# Patient Record
Sex: FEMALE | Race: BLACK OR AFRICAN AMERICAN
[De-identification: names, ages, dates, MRNs, and addresses within clinical notes are randomized per-mention and may not be internally consistent; named-entity substitution may affect disease eponyms.]

---
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
## 2023-08-01 ENCOUNTER — OFFICE VISIT (OUTPATIENT)
Dept: OBSTETRICS AND GYNECOLOGY | Facility: CLINIC | Age: 18
End: 2023-08-01
Payer: COMMERCIAL

## 2023-08-01 VITALS
DIASTOLIC BLOOD PRESSURE: 79 MMHG | WEIGHT: 235 LBS | BODY MASS INDEX: 36.88 KG/M2 | HEIGHT: 67 IN | SYSTOLIC BLOOD PRESSURE: 115 MMHG

## 2023-08-01 DIAGNOSIS — Z01.419 VISIT FOR GYNECOLOGIC EXAMINATION: Primary | ICD-10-CM

## 2023-08-01 DIAGNOSIS — N93.8 DYSFUNCTIONAL UTERINE BLEEDING: ICD-10-CM

## 2023-08-01 DIAGNOSIS — Z11.3 SCREENING FOR STDS (SEXUALLY TRANSMITTED DISEASES): ICD-10-CM

## 2023-08-02 LAB
FSH SERPL-ACNC: 6 MIU/ML
TSH SERPL DL<=0.005 MIU/L-ACNC: 1.15 UIU/ML (ref 0.45–4.5)

## 2023-08-03 ENCOUNTER — TELEPHONE (OUTPATIENT)
Dept: OBSTETRICS AND GYNECOLOGY | Facility: CLINIC | Age: 18
End: 2023-08-03
Payer: COMMERCIAL

## 2024-08-06 ENCOUNTER — OFFICE VISIT (OUTPATIENT)
Dept: OBSTETRICS AND GYNECOLOGY | Facility: CLINIC | Age: 19
End: 2024-08-06
Payer: COMMERCIAL

## 2024-08-06 VITALS
WEIGHT: 234 LBS | SYSTOLIC BLOOD PRESSURE: 111 MMHG | HEIGHT: 66 IN | DIASTOLIC BLOOD PRESSURE: 68 MMHG | BODY MASS INDEX: 37.61 KG/M2

## 2024-08-06 DIAGNOSIS — Z01.419 VISIT FOR GYNECOLOGIC EXAMINATION: Primary | ICD-10-CM

## 2024-08-06 DIAGNOSIS — N89.8 VAGINAL DISCHARGE: ICD-10-CM

## 2024-08-06 DIAGNOSIS — Z32.00 VISIT FOR CONFIRMATION OF PREGNANCY TEST RESULT WITH PHYSICAL EXAM: ICD-10-CM

## 2024-08-06 LAB
B-HCG UR QL: POSITIVE
EXPIRATION DATE: ABNORMAL
INTERNAL NEGATIVE CONTROL: NEGATIVE
INTERNAL POSITIVE CONTROL: POSITIVE
Lab: ABNORMAL

## 2024-08-06 RX ORDER — ALBUTEROL SULFATE 90 UG/1
2 AEROSOL, METERED RESPIRATORY (INHALATION)
COMMUNITY
Start: 2024-07-15

## 2024-08-06 RX ORDER — PNV NO.95/FERROUS FUM/FOLIC AC 28MG-0.8MG
1 TABLET ORAL DAILY
Qty: 30 TABLET | Refills: 11 | Status: SHIPPED | OUTPATIENT
Start: 2024-08-06

## 2024-08-06 NOTE — PROGRESS NOTES
[facility]  [address], [address]  [place], [place] [address]  Phone: [phone] / Fax:  [phone]      2024    [address] [facility] [address]    Provider, No Known    Chief Complaint   Patient presents with    Gynecologic Exam     Annual Exam, 1st pap. Patient state she took 2 home UCGs yesterday that was positive. LMP 6-28-24 making her 5.4 weeks in gestation. UCG in office is positive. She is c/o clear discharge.       Latonya Jordan is here for annual gynecologic exam.  HPI - Patient with no previous pap history.  She has regular cycles and not on contraception.  She missed her last period at end of  and had 2 positive home pregnancy tests.  She reports having a clear vaginal discharge but denies STD exposure.    Past Medical History:   Diagnosis Date    Asthma     COVID-19 2022    Not vaxxed       Past Surgical History:   Procedure Laterality Date    TONSILLO-UVULOPALATOPHARYNGOPLASTY Left 2022    abscess       Allergies   Allergen Reactions    Latex Itching       Social History     Socioeconomic History    Marital status: Single   Tobacco Use    Smoking status: Never   Vaping Use    Vaping status: Never Used   Substance and Sexual Activity    Alcohol use: Not Currently    Drug use: Not Currently     Types: Marijuana    Sexual activity: Yes     Birth control/protection: None       Family History   Problem Relation Age of Onset    No Known Problems Father     No Known Problems Mother     No Known Problems Brother     No Known Problems Brother     No Known Problems Brother     No Known Problems Brother     No Known Problems Brother     No Known Problems Brother     No Known Problems Brother     No Known Problems Brother     No Known Problems Sister     No Known Problems Sister     Breast cancer Neg Hx     Colon cancer Neg Hx        Patient's last menstrual period was 2024 (exact date).    OB History          1    Para   0    Term   0      "  0    AB   0    Living   0         SAB   0    IAB   0    Ectopic   0    Molar   0    Multiple   0    Live Births   0                Vitals:    08/06/24 1109   BP: 111/68   Weight: 106 kg (234 lb)   Height: 167.6 cm (66\")       Physical Exam  Constitutional:       Appearance: Normal appearance. She is well-developed.   Genitourinary:      Bladder and urethral meatus normal.      Right Labia: No tenderness or lesions.     Left Labia: No tenderness or lesions.     No vaginal discharge or tenderness.        Right Adnexa: not tender and not full.     Left Adnexa: not tender and not full.     No cervical motion tenderness or lesion.      Uterus is not enlarged or tender.      No urethral tenderness or hypermobility present.   Breasts:     Right: No mass or nipple discharge.      Left: No mass or nipple discharge.   HENT:      Right Ear: External ear normal.      Left Ear: External ear normal.      Nose: Nose normal.   Eyes:      Conjunctiva/sclera: Conjunctivae normal.   Neck:      Thyroid: No thyromegaly.   Cardiovascular:      Rate and Rhythm: Normal rate and regular rhythm.      Heart sounds: Normal heart sounds.   Pulmonary:      Effort: Pulmonary effort is normal.      Breath sounds: No stridor. No wheezing.   Abdominal:      Palpations: Abdomen is soft.      Tenderness: There is no abdominal tenderness. There is no guarding or rebound.   Musculoskeletal:         General: Normal range of motion.      Cervical back: Normal range of motion and neck supple.   Neurological:      Mental Status: She is alert.      Coordination: Coordination normal.   Skin:     General: Skin is warm and dry.   Psychiatric:         Mood and Affect: Mood normal.         Behavior: Behavior normal.         Thought Content: Thought content normal.         Judgment: Judgment normal.   Vitals reviewed. Exam conducted with a chaperone present.         Diagnoses and all orders for this visit:    1. Visit for gynecologic examination (Primary)  -     " IGP, Rfx Aptima HPV ASCU  -     Discussed importance of regular screening and breast awareness.    2. Visit for confirmation of pregnancy test result with physical exam  -     Prenatal Vit-Fe Fumarate-FA (Prenatal Vitamin) 27-0.8 MG tablet; Take 1 tablet by mouth Daily.  Dispense: 30 tablet; Refill: 11  -     POC Pregnancy, Urine  -     Discussed management of early pregnancy.  Important to take folate and prenatal vitamins prescribed.  Patient should eat healthy and return for follow up/ultrasound in 2 weeks.  Patient is considering termination of pregnancy; discussed not delaying process if patient plans to take this route.  She will need follow up with me afterward.    3. Vaginal discharge  -     NuSwab VG+ - Swab, Vagina  -     Await swab.        Danial Maldonado MD

## 2024-08-09 ENCOUNTER — TELEPHONE (OUTPATIENT)
Dept: OBSTETRICS AND GYNECOLOGY | Facility: CLINIC | Age: 19
End: 2024-08-09
Payer: COMMERCIAL

## 2024-08-09 LAB
A VAGINAE DNA VAG QL NAA+PROBE: ABNORMAL SCORE
BVAB2 DNA VAG QL NAA+PROBE: ABNORMAL SCORE
C ALBICANS DNA VAG QL NAA+PROBE: POSITIVE
C GLABRATA DNA VAG QL NAA+PROBE: NEGATIVE
C TRACH DNA SPEC QL NAA+PROBE: NEGATIVE
CONV .: NORMAL
CYTOLOGIST CVX/VAG CYTO: NORMAL
CYTOLOGY CVX/VAG DOC CYTO: NORMAL
CYTOLOGY CVX/VAG DOC THIN PREP: NORMAL
DX ICD CODE: NORMAL
Lab: NORMAL
MEGA1 DNA VAG QL NAA+PROBE: ABNORMAL SCORE
N GONORRHOEA DNA VAG QL NAA+PROBE: NEGATIVE
OTHER STN SPEC: NORMAL
STAT OF ADQ CVX/VAG CYTO-IMP: NORMAL
T VAGINALIS DNA VAG QL NAA+PROBE: NEGATIVE

## 2024-08-09 RX ORDER — FLUCONAZOLE 150 MG/1
150 TABLET ORAL ONCE
Qty: 1 TABLET | Refills: 0 | Status: SHIPPED | OUTPATIENT
Start: 2024-08-09 | End: 2024-08-09

## 2024-08-09 RX ORDER — METRONIDAZOLE 500 MG/1
500 TABLET ORAL 2 TIMES DAILY
Qty: 14 TABLET | Refills: 0 | Status: SHIPPED | OUTPATIENT
Start: 2024-08-09 | End: 2024-08-09 | Stop reason: SDUPTHER

## 2024-08-09 RX ORDER — FLUCONAZOLE 150 MG/1
150 TABLET ORAL ONCE
Qty: 1 TABLET | Refills: 0 | Status: SHIPPED | OUTPATIENT
Start: 2024-08-09 | End: 2024-08-09 | Stop reason: SDUPTHER

## 2024-08-09 RX ORDER — METRONIDAZOLE 500 MG/1
500 TABLET ORAL 2 TIMES DAILY
Qty: 14 TABLET | Refills: 0 | Status: SHIPPED | OUTPATIENT
Start: 2024-08-09 | End: 2024-08-16

## 2024-08-09 NOTE — TELEPHONE ENCOUNTER
Left message for Latonya that Dr Maldonado said you have a yeast and bacterial infection. He sent in fluconazole(diflucan) for the yeast and metronidazole(flagyl) for the bacteria. This has gone to [place] at [address] [facility] in [place]. Please do not drink alcohol when taking flagyl as it may cause you to get sick and vomit. We recommend you take the diflucan after you complete the flagyl as some women state the abx causes a yeast infection.

## 2024-08-09 NOTE — TELEPHONE ENCOUNTER
Hailey    Let her know that she has a yeast and a bacterial infection.  I called in medication.    Thanks    Erica

## 2024-08-09 NOTE — TELEPHONE ENCOUNTER
Called in and stated we need to update her pharmacy. Per her request, updated and moved diflucan and flagyl to [facility].     Left message for Latonya that the Rx's were moved and pharmacy updated in [facility].

## 2024-08-09 NOTE — TELEPHONE ENCOUNTER
Caller: Latonya Jordan    Relationship: Self    Best call back number: [phone]    What is the best time to reach you: ANY    Who are you requesting to speak with (clinical staff, provider,  specific staff member): CLINICAL     What was the call regarding: PT STATES FLUCONAZOLE AND METRONIDAZOLE WERE CALLED IN TO WRONG PHARMACY. NEEDING THEM SENT TO [place] [address]. [phone]   PT REQUESTING A CALL BACK FOR CONFORMATION.

## 2024-08-22 ENCOUNTER — INITIAL PRENATAL (OUTPATIENT)
Dept: OBSTETRICS AND GYNECOLOGY | Facility: CLINIC | Age: 19
End: 2024-08-22
Payer: COMMERCIAL

## 2024-08-22 VITALS — BODY MASS INDEX: 36.96 KG/M2 | SYSTOLIC BLOOD PRESSURE: 112 MMHG | DIASTOLIC BLOOD PRESSURE: 74 MMHG | WEIGHT: 229 LBS

## 2024-08-22 DIAGNOSIS — Z34.01 PRIMIGRAVIDA, FIRST TRIMESTER: Primary | ICD-10-CM

## 2024-08-22 DIAGNOSIS — Z3A.01 7 WEEKS GESTATION OF PREGNANCY: ICD-10-CM

## 2024-08-22 LAB
EXPIRATION DATE: ABNORMAL
GLUCOSE UR STRIP-MCNC: NEGATIVE MG/DL
Lab: ABNORMAL
PROT UR STRIP-MCNC: ABNORMAL MG/DL

## 2024-08-22 RX ORDER — METRONIDAZOLE 500 MG/1
TABLET ORAL
COMMUNITY
Start: 2024-08-21

## 2024-08-22 NOTE — PROGRESS NOTES
Cc:  First visit for new pregnancy  Patient had pregnancy confirmed today with ultrasound.  She c/o headaches and being nauseated. She is asking can she take supplements for hair and skin which include Biotin and Vitamin C.  No bleeding or spotting.  Past medical, surgical, obstetric, genetic, social and family histories reviewed by me.  Allergies and medications reviewed.  10 Point ROS reviewed and all pertinent negative.  Physical exam documented in chart.  Prenatal labs ordered  Ultrasound with 7+ week gestation with cardiac activity.  A/P:  IUP at 7 weeks gestation  - Discussed set up of our practice, timing of ultrasound, taking of vitamins and nutrition.

## 2024-08-23 LAB
ABO GROUP BLD: NORMAL
BASOPHILS # BLD AUTO: 0.1 X10E3/UL (ref 0–0.2)
BASOPHILS NFR BLD AUTO: 1 %
BLD GP AB SCN SERPL QL: NEGATIVE
EOSINOPHIL # BLD AUTO: 0.1 X10E3/UL (ref 0–0.4)
EOSINOPHIL NFR BLD AUTO: 1 %
ERYTHROCYTE [DISTWIDTH] IN BLOOD BY AUTOMATED COUNT: 13.2 % (ref 11.7–15.4)
HBV SURFACE AG SERPL QL IA: NEGATIVE
HCT VFR BLD AUTO: 40.3 % (ref 34–46.6)
HCV AB SERPL QL IA: NORMAL
HCV IGG SERPL QL IA: NON REACTIVE
HGB A MFR BLD ELPH: 97.6 % (ref 96.4–98.8)
HGB A2 MFR BLD ELPH: 2.4 % (ref 1.8–3.2)
HGB BLD-MCNC: 13.1 G/DL (ref 11.1–15.9)
HGB F MFR BLD ELPH: 0 % (ref 0–2)
HGB FRACT BLD-IMP: NORMAL
HGB S MFR BLD ELPH: 0 %
HIV 1+2 AB+HIV1 P24 AG SERPL QL IA: NON REACTIVE
IMM GRANULOCYTES # BLD AUTO: 0 X10E3/UL (ref 0–0.1)
IMM GRANULOCYTES NFR BLD AUTO: 0 %
LYMPHOCYTES # BLD AUTO: 1.6 X10E3/UL (ref 0.7–3.1)
LYMPHOCYTES NFR BLD AUTO: 19 %
MCH RBC QN AUTO: 28.8 PG (ref 26.6–33)
MCHC RBC AUTO-ENTMCNC: 32.5 G/DL (ref 31.5–35.7)
MCV RBC AUTO: 89 FL (ref 79–97)
MONOCYTES # BLD AUTO: 0.5 X10E3/UL (ref 0.1–0.9)
MONOCYTES NFR BLD AUTO: 7 %
NEUTROPHILS # BLD AUTO: 5.9 X10E3/UL (ref 1.4–7)
NEUTROPHILS NFR BLD AUTO: 72 %
PLATELET # BLD AUTO: 302 X10E3/UL (ref 150–450)
RBC # BLD AUTO: 4.55 X10E6/UL (ref 3.77–5.28)
RH BLD: POSITIVE
RPR SER QL: NON REACTIVE
RUBV IGG SERPL IA-ACNC: 3.49 INDEX
WBC # BLD AUTO: 8.2 X10E3/UL (ref 3.4–10.8)

## 2024-08-24 LAB
BACTERIA UR CULT: NORMAL
BACTERIA UR CULT: NORMAL

## 2024-08-25 LAB
AMPHETAMINES UR QL SCN: NEGATIVE NG/ML
BARBITURATES UR QL SCN: NEGATIVE NG/ML
BENZODIAZ UR QL: NEGATIVE NG/ML
BZE UR QL: NEGATIVE NG/ML
CARBOXYTHC UR QL CFM: POSITIVE
METHADONE UR QL SCN: NEGATIVE NG/ML
OPIATES UR QL: NEGATIVE NG/ML
PCP UR QL SCN: NEGATIVE NG/ML
PROPOXYPH UR QL SCN: NEGATIVE NG/ML

## 2024-09-09 ENCOUNTER — TELEPHONE (OUTPATIENT)
Dept: OBSTETRICS AND GYNECOLOGY | Facility: CLINIC | Age: 19
End: 2024-09-09
Payer: COMMERCIAL

## 2024-09-09 NOTE — TELEPHONE ENCOUNTER
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

## 2024-09-10 NOTE — TELEPHONE ENCOUNTER
Caller: Latonya Jordan    Relationship to patient: Self    Best call back number: [phone]    Patient is needing: PATIENT CALLED AND STATED THAT SHE NEEDS THE LETTER WITH RESTRICTIONS FOR HER EMPLOYER AS SOON AS POSSIBLE    NOTHING PRESSING UP AGAINST HER STOMACH AND NO BENDING OVER CONSTANTLY     PATIENT STATED SHE NEEDS THIS TO BE COMPLETED BY THIS FRIDAY

## 2024-09-13 ENCOUNTER — TELEPHONE (OUTPATIENT)
Dept: OBSTETRICS AND GYNECOLOGY | Facility: CLINIC | Age: 19
End: 2024-09-13
Payer: COMMERCIAL

## 2024-09-13 NOTE — TELEPHONE ENCOUNTER
11w ob pt called about her accommodations letter for work.  States she works 10 hrs per day and has to bend over alot more than 10 times within an hour.  Her employer will not put her in a different position unless the letter states she cannot bend over at all.  They sent her home again today, due to the restrictions.     She is bringing an FMLA packet to the office today, but she needs a separate letter for her shift tomorrow.     Ok to amend her accommodations letter?        Please advise.     Thanks,   Jena

## 2024-09-19 ENCOUNTER — ROUTINE PRENATAL (OUTPATIENT)
Dept: OBSTETRICS AND GYNECOLOGY | Facility: CLINIC | Age: 19
End: 2024-09-19
Payer: COMMERCIAL

## 2024-09-19 VITALS — DIASTOLIC BLOOD PRESSURE: 74 MMHG | BODY MASS INDEX: 36.15 KG/M2 | SYSTOLIC BLOOD PRESSURE: 114 MMHG | WEIGHT: 224 LBS

## 2024-09-19 DIAGNOSIS — O26.891 PREGNANCY HEADACHE IN FIRST TRIMESTER: ICD-10-CM

## 2024-09-19 DIAGNOSIS — R51.9 PREGNANCY HEADACHE IN FIRST TRIMESTER: ICD-10-CM

## 2024-09-19 DIAGNOSIS — Z34.01 PRIMIGRAVIDA, FIRST TRIMESTER: Primary | ICD-10-CM

## 2024-09-19 DIAGNOSIS — Z3A.11 11 WEEKS GESTATION OF PREGNANCY: ICD-10-CM

## 2024-09-19 LAB
EXPIRATION DATE: NORMAL
GLUCOSE UR STRIP-MCNC: NEGATIVE MG/DL
Lab: NORMAL
PROT UR STRIP-MCNC: NEGATIVE MG/DL

## 2024-09-25 ENCOUNTER — TELEPHONE (OUTPATIENT)
Dept: OBSTETRICS AND GYNECOLOGY | Facility: CLINIC | Age: 19
End: 2024-09-25
Payer: COMMERCIAL

## 2024-09-25 LAB
CFDNA.FET/CFDNA.TOTAL SFR FETUS: NORMAL %
CITATION REF LAB TEST: NORMAL
FET 13+18+21+X+Y ANEUP PLAS.CFDNA: NEGATIVE
FET CHR 21 TS PLAS.CFDNA QL: NEGATIVE
FET MS X RISK WBC.DNA+CFDNA QL: NOT DETECTED
FET SEX PLAS.CFDNA DOSAGE CFDNA: NORMAL
FET TS 13 RISK PLAS.CFDNA QL: NEGATIVE
FET TS 18 RISK WBC.DNA+CFDNA QL: NEGATIVE
FET X + Y ANEUP RISK PLAS.CFDNA SEQ-IMP: NOT DETECTED
GA EST FROM CONCEPTION DATE: NORMAL D
GESTATIONAL AGE > 9:: YES
LAB DIRECTOR NAME PROVIDER: NORMAL
LAB DIRECTOR NAME PROVIDER: NORMAL
LABORATORY COMMENT REPORT: NORMAL
LIMITATIONS OF THE TEST: NORMAL
NEGATIVE PREDICTIVE VALUE: NORMAL
NOTE: NORMAL
PERFORMANCE CHARACTERISTICS: NORMAL
POSITIVE PREDICTIVE VALUE: NORMAL
REF LAB TEST METHOD: NORMAL
TEST PERFORMANCE INFO SPEC: NORMAL

## 2024-10-04 ENCOUNTER — TELEPHONE (OUTPATIENT)
Dept: OBSTETRICS AND GYNECOLOGY | Facility: CLINIC | Age: 19
End: 2024-10-04
Payer: COMMERCIAL

## 2024-10-04 NOTE — TELEPHONE ENCOUNTER
"    Caller: Latonya Jordan \"Day-me-uh\"    Relationship to patient: Self    Best call back number: [phone] (home)       Patient is needing: PT CALLED IN STATING THAT SHE HAS BEEN EXPERIENCING IRRITATION, DRYNESS, AND BURNING WHILE URINATION. USED COCONUT OIL IN BETWEEN THE LIPS OF HER VAGINA AND BELIEVES THAT MIGHT BE THE CAUSE. BEEN GOING ON FOR THREE DAYS, HAS BEEN TRYING HOT BATHS AND HOT WATER TO HELP ALLEVIATE. WANTS TO KNOW IF SOMETHING CAN BE SENT IN. WANTS PHARMACY [facility] #[id number] [place], [place] - [address] AT [facility] [address] - [phone]  - [phone] FX           "

## 2024-10-07 ENCOUNTER — TELEPHONE (OUTPATIENT)
Dept: OBSTETRICS AND GYNECOLOGY | Facility: CLINIC | Age: 19
End: 2024-10-07

## 2024-10-07 NOTE — TELEPHONE ENCOUNTER
"      Hub staff attempted to follow warm transfer process and was unsuccessful     Caller: Latonya Jordan \"Day-me-uh\"    Relationship to patient: Self    Best call back number:[phone] (home)       Patient is needing: PT RETURNED DR BARFIELD CALL REGARDING HER CONCERNS OF VAGINAL DRYNESS AND IRRITATION. PER DR BARFIEDL REPLY SHE CAN BE SEEN IN OFFICE. OFFERED APPT BUT PT STATED SHE WOULD NOT BE ABLE TO COME IN UNTIL HER SCHEDULED APPT ON 10/17. SHE STATED IF IT WORSENED SHE WOULD GO TO THE ER.    "

## 2024-10-10 ENCOUNTER — HOSPITAL ENCOUNTER (EMERGENCY)
Facility: HOSPITAL | Age: 19
Discharge: HOME | End: 2024-10-10
Attending: STUDENT IN AN ORGANIZED HEALTH CARE EDUCATION/TRAINING PROGRAM
Payer: COMMERCIAL

## 2024-10-10 VITALS
TEMPERATURE: 98.6 F | HEIGHT: 66 IN | HEART RATE: 74 BPM | SYSTOLIC BLOOD PRESSURE: 106 MMHG | WEIGHT: 224 LBS | RESPIRATION RATE: 16 BRPM | DIASTOLIC BLOOD PRESSURE: 76 MMHG | BODY MASS INDEX: 36 KG/M2 | OXYGEN SATURATION: 99 %

## 2024-10-10 DIAGNOSIS — J02.9 PHARYNGITIS, UNSPECIFIED ETIOLOGY: Primary | ICD-10-CM

## 2024-10-10 PROCEDURE — 99282 EMERGENCY DEPT VISIT SF MDM: CPT

## 2024-10-10 RX ORDER — AMOXICILLIN 500 MG/1
1000 CAPSULE ORAL 2 TIMES DAILY
Qty: 14 CAPSULE | Refills: 0 | Status: SHIPPED | OUTPATIENT
Start: 2024-10-10 | End: 2024-10-10

## 2024-10-10 RX ORDER — AMOXICILLIN 500 MG/1
500 CAPSULE ORAL 2 TIMES DAILY
Qty: 14 CAPSULE | Refills: 0 | Status: SHIPPED | OUTPATIENT
Start: 2024-10-10 | End: 2024-10-17

## 2024-10-10 NOTE — DISCHARGE INSTRUCTIONS

## 2024-10-10 NOTE — FS[facility] PROVIDER NOTE
"Subjective   History of Present Illness  This is a 19-year-old female, 14 weeks pregnant, who presents with a sore throat for the last day or so.  She states that she has had chills but no fever.  She states that she works at a  and she \"wants a work note because I do not know where I got sick from and I do not want to make it worse.\"  The patient denies any difficulty swallowing, headache, neck stiffness, rash or drooling.  No cough or productive sputum.      Review of Systems   Constitutional:  Negative for activity change, appetite change, diaphoresis and fatigue.   All other systems reviewed and are negative.      Past Medical History:   Diagnosis Date    Asthma     COVID-19 01/2022    Not vaxxed       Allergies   Allergen Reactions    Latex Itching       Past Surgical History:   Procedure Laterality Date    TONSILLO-UVULOPALATOPHARYNGOPLASTY Left 06/28/2022    abscess       Family History   Problem Relation Age of Onset    No Known Problems Father     No Known Problems Mother     No Known Problems Brother     No Known Problems Brother     No Known Problems Brother     No Known Problems Brother     No Known Problems Brother     No Known Problems Brother     No Known Problems Brother     No Known Problems Brother     No Known Problems Sister     No Known Problems Sister     Breast cancer Neg Hx     Colon cancer Neg Hx        Social History     Socioeconomic History    Marital status: Single   Tobacco Use    Smoking status: Never   Vaping Use    Vaping status: Never Used   Substance and Sexual Activity    Alcohol use: Not Currently    Drug use: Not Currently     Types: Marijuana    Sexual activity: Yes     Birth control/protection: None           Objective   Physical Exam  Vitals and nursing note reviewed.   Constitutional:       General: She is not in acute distress.     Appearance: Normal appearance. She is not ill-appearing, toxic-appearing or diaphoretic.   HENT:      Head: Normocephalic and atraumatic. "      Right Ear: Tympanic membrane and external ear normal.      Left Ear: Tympanic membrane and external ear normal.      Nose: Nose normal. No congestion or rhinorrhea.      Mouth/Throat:      Mouth: Mucous membranes are moist.      Pharynx: No oropharyngeal exudate or posterior oropharyngeal erythema.      Comments: On evaluation of the oropharynx there is mild erythema with some tonsillar exudate, no obvious evidence of palatal petechiae, no peritonsillar abscess, no evidence of sublingual swelling or sialoadenitis.  Eyes:      Conjunctiva/sclera: Conjunctivae normal.      Pupils: Pupils are equal, round, and reactive to light.   Cardiovascular:      Rate and Rhythm: Normal rate and regular rhythm.      Pulses: Normal pulses.   Pulmonary:      Effort: Pulmonary effort is normal. No respiratory distress.      Breath sounds: Normal breath sounds. No stridor. No wheezing, rhonchi or rales.   Chest:      Chest wall: No tenderness.   Abdominal:      General: There is no distension.      Palpations: Abdomen is soft. There is no mass.      Tenderness: There is no guarding or rebound.   Musculoskeletal:         General: No swelling or deformity. Normal range of motion.      Cervical back: Normal range of motion and neck supple. No rigidity or tenderness.   Lymphadenopathy:      Cervical: Cervical adenopathy (Anterior) present.   Skin:     General: Skin is warm.      Capillary Refill: Capillary refill takes less than 2 seconds.      Coloration: Skin is not pale.   Neurological:      General: No focal deficit present.      Mental Status: She is alert and oriented to person, place, and time. Mental status is at baseline.   Psychiatric:         Mood and Affect: Mood normal.         Thought Content: Thought content normal.         Procedures           ED Course                                           Medical Decision Making    MDM:    Escalation of care including admission/observation considered    - Discussions of  management with other providers:  None    - Discussed/reviewed with Radiology regarding test interpretation    - Independent interpretation: None    - Additional patient history obtained from: None    - Review of external non-ED record (if available):  Prior Inpt record, Office record, Outpt record, Prior Outpt labs, PCP record, Outside ED record, Other    - Chronic conditions affecting care: See HPI and medical Hx.    - Social Determinants of health significantly affecting care:  None        Medical Decision Making Discussion:    Well-appearing 19-year-old presents to the emergency department complaints of a sore throat.  The patient would like a work note.  The patient is well-appearing, possible strep pharyngitis, Centor criteria of about 4, I will empirically treat.  The patient is otherwise well-appearing, no evidence of acute abnormality.  She is pregnant but no vaginal bleeding or cramping, no discharge.  Patient is otherwise comfortable, will be discharged with follow-up outpatient.    The patient has been given very strict return precautions to return to the emergency department should there be any acute change or worsening of their condition.  I have explained my findings and the patient has expressed understanding to me.  I explained that the work-up performed in the ED has been based on the specific complaint and concern, as the nature of emergency medicine is complaint driven and they understand that new symptoms may arise.  I have told them that, should there be any new symptoms, worsening or changing symptoms, a new work-up may be indicated that they are encouraged to return to the emergency department or promptly contact their primary care physician. We have employed a shared decision-making process as the discussion of their disposition.  The patient has been educated as to the nature of the visit, the tests and work-up performed and the findings from today's visit. At this time, there does not  appear to be any acute emergent process that necessitates admission to the hospital, however, the patient understands that this can change unexpectedly. At this time, the patient is stable for discharge home and agrees to follow-up with her primary care physician in the next 24 to 48 hours or earlier should they be able to obtain an appointment.    The patient was counseled regarding diagnostic results and treatment plan and patient has indicated understanding of these instructions.      Problems Addressed:  Pharyngitis, unspecified etiology: complicated acute illness or injury    Risk  Prescription drug management.        Final diagnoses:   Pharyngitis, unspecified etiology       ED Disposition  ED Disposition       ED Disposition   Discharge    Condition   Good    Comment   --               Eliane Urena MD  [address] [id number] [phone]               Medication List        New Prescriptions      amoxicillin 500 MG capsule  Commonly known as: AMOXIL  Take 1 capsule by mouth 2 (Two) Times a Day for 7 days.               Where to Get Your Medications        These medications were sent to [id number] - [place], [place] - [address] - [phone]  - [phone]   [address] [address]      Phone: [phone]   amoxicillin 500 MG capsule

## 2024-10-10 NOTE — Clinical Note
[facility]  [address]  [place] [address]    Latonya Jordan was seen and treated in our emergency department on 10/10/2024.  She may return to work on 10/14/2024.         Thank you for choosing [facility].    Salazar Downey, DO

## 2024-10-17 ENCOUNTER — ROUTINE PRENATAL (OUTPATIENT)
Dept: OBSTETRICS AND GYNECOLOGY | Facility: CLINIC | Age: 19
End: 2024-10-17
Payer: COMMERCIAL

## 2024-10-17 VITALS — SYSTOLIC BLOOD PRESSURE: 115 MMHG | WEIGHT: 228 LBS | DIASTOLIC BLOOD PRESSURE: 72 MMHG | BODY MASS INDEX: 36.8 KG/M2

## 2024-10-17 DIAGNOSIS — Z34.02 PRIMIGRAVIDA, SECOND TRIMESTER: Primary | ICD-10-CM

## 2024-10-17 DIAGNOSIS — Z3A.15 15 WEEKS GESTATION OF PREGNANCY: ICD-10-CM

## 2024-10-17 RX ORDER — ACETAMINOPHEN 325 MG/1
650 TABLET ORAL
COMMUNITY
Start: 2024-10-17

## 2024-10-17 NOTE — PROGRESS NOTES
Cc:  Pregnancy follow up.  Patient was in the ER last week and was diagnosed presumptively with Strep throat. She then went again yesterday for sinus issues.  She is currently completing antibiotics but not using any OTC treatments.  She has a good appetite but is fatigued.  Vitals reviewed by me.  Gen - alert and pleasant.  Abdomen - gravid, nontender  A/P:  IUP at 15 weeks with respiratory issues in pregnancy.  - Pulm.  Discussed OTC medications and patient referred to office list of treatments.    - Sonogram for anatomy in 4 weeks.  Discussed maternal well being.

## 2024-10-30 ENCOUNTER — REFERRAL TRIAGE (OUTPATIENT)
Dept: LABOR AND DELIVERY | Facility: HOSPITAL | Age: 19
End: 2024-10-30
Payer: COMMERCIAL

## 2024-11-07 ENCOUNTER — PATIENT OUTREACH (OUTPATIENT)
Dept: LABOR AND DELIVERY | Facility: HOSPITAL | Age: 19
End: 2024-11-07
Payer: COMMERCIAL

## 2024-11-07 NOTE — OUTREACH NOTE
[facility]  Unable to Reach    Questions/Answers      Flowsheet Row Responses   Pending Outreach Confirm Patient Interest   Call Attempt First   Outcome No answer/busy   Next Call Attempt Date 11/14/24   Unable to reach comments: VM box full          Hallie S - RN  Maternity Nurse Navigator    11/7/2024, 12:35 EST

## 2024-11-14 ENCOUNTER — ROUTINE PRENATAL (OUTPATIENT)
Dept: OBSTETRICS AND GYNECOLOGY | Facility: CLINIC | Age: 19
End: 2024-11-14
Payer: COMMERCIAL

## 2024-11-14 VITALS — DIASTOLIC BLOOD PRESSURE: 76 MMHG | BODY MASS INDEX: 38.41 KG/M2 | WEIGHT: 238 LBS | SYSTOLIC BLOOD PRESSURE: 125 MMHG

## 2024-11-14 DIAGNOSIS — O99.612 GASTROESOPHAGEAL REFLUX DURING PREGNANCY IN SECOND TRIMESTER, ANTEPARTUM: ICD-10-CM

## 2024-11-14 DIAGNOSIS — Z34.02 PRIMIGRAVIDA, SECOND TRIMESTER: Primary | ICD-10-CM

## 2024-11-14 DIAGNOSIS — Z23 FLU VACCINE NEED: ICD-10-CM

## 2024-11-14 DIAGNOSIS — Z3A.19 19 WEEKS GESTATION OF PREGNANCY: ICD-10-CM

## 2024-11-14 DIAGNOSIS — K21.9 GASTROESOPHAGEAL REFLUX DURING PREGNANCY IN SECOND TRIMESTER, ANTEPARTUM: ICD-10-CM

## 2024-11-14 LAB
GLUCOSE UR STRIP-MCNC: NEGATIVE MG/DL
PROT UR STRIP-MCNC: ABNORMAL MG/DL

## 2024-11-14 NOTE — PROGRESS NOTES
Cc:  Pregnancy follow up.  Patient with fatigue, round ligament pain and intermittent facial swelling, particularly around eyes.  Patient feels facial swelling is related to seasonal allergies; she is not taking any medication for this.  She also has GERD at night.  Good FM.  No bleeding or spotting.  Vitals reviewed by me.  Gen - alert and pleasant.  Abdomen - gravid, nontender, no guarding or rebound.  Ultrasound with normal anatomy but suboptimal spine and lower extremities.  A/P:  IUP at 19 weeks with suboptimal sonogram, GERD, pain, allergies.  - Sonogram.  Repeat in 5 weeks.  - GDM testing, CBC and treponemal testing next visit.  - Patient received Flu vaccine in Left arm, office supplied, no reaction.   - GERD.  OTC antacids recommended.  Discussed dietary modifications.  - Allergies.  OK to use Sudafed or Claritin.

## 2024-11-15 ENCOUNTER — PATIENT OUTREACH (OUTPATIENT)
Dept: LABOR AND DELIVERY | Facility: HOSPITAL | Age: 19
End: 2024-11-15
Payer: COMMERCIAL

## 2024-11-15 NOTE — OUTREACH NOTE
[facility]  Enrollment    Current Estimated Gestational Age: 20w0d    Questions/Answers      Flowsheet Row Responses   Would like to participate? Yes   Date of Intake Visit 11/15/24          [facility]  Intake    Current Estimated Gestational Age: 20w0d    Intake Assessment      Flowsheet Row Responses   Best Method for Contacting Home   Currently Employed Yes  [assisted living]   Able to keep appointments as scheduled Yes   Gender(s) and Name(s) boy   Do you have a dentist? Yes   Dentist Name Brendan   Have you seen a dentist in the last 6 months No   Resources Presently Utilizing: None   Maternal Warning Signs Provided   Other Education How to find a pediatrician, Insurance benefits/Incentives, WIC Benefits            Learning Assessment      Flowsheet Row Responses   Relationship Patient   Learner Name Latonya   Does the learner have any barriers to learning? No Barriers   What is the preferred language of the learner for medical teaching? English   Is an  required? No   How does the learner prefer to learn new concepts? Listening, Reading, Demonstration, Pictures/Video          Intake completed today. She lives with FOB and reports stable housing but is interested in moving to income based housing, list sent. She has reliable transportation with family but also hoping to get her own car soon. Reviewed WIC/SNAP. She is planning a baby shower, encouraged her to call [employer] about gift card. Reviewed maternal warning signs and location of L&D. F/u in 1-2 months.     Hallie Buenrostro RN  Maternity Nurse Navigator    11/15/2024, 13:13 EST

## 2024-11-25 ENCOUNTER — HOSPITAL ENCOUNTER (EMERGENCY)
Facility: HOSPITAL | Age: 19
Discharge: HOME | End: 2024-11-25
Attending: OBSTETRICS & GYNECOLOGY | Admitting: OBSTETRICS & GYNECOLOGY
Payer: COMMERCIAL

## 2024-11-25 VITALS
TEMPERATURE: 98.2 F | SYSTOLIC BLOOD PRESSURE: 114 MMHG | HEART RATE: 71 BPM | OXYGEN SATURATION: 100 % | RESPIRATION RATE: 16 BRPM | DIASTOLIC BLOOD PRESSURE: 64 MMHG

## 2024-11-25 LAB
BACTERIA UR QL AUTO: ABNORMAL /HPF
BILIRUB UR QL STRIP: NEGATIVE
CLARITY UR: CLEAR
COLOR UR: YELLOW
GLUCOSE UR STRIP-MCNC: NEGATIVE MG/DL
HGB UR QL STRIP.AUTO: NEGATIVE
HYALINE CASTS UR QL AUTO: ABNORMAL /LPF
KETONES UR QL STRIP: ABNORMAL
LEUKOCYTE ESTERASE UR QL STRIP.AUTO: ABNORMAL
NITRITE UR QL STRIP: NEGATIVE
PH UR STRIP.AUTO: 6.5 [PH] (ref 5–8)
PROT UR QL STRIP: ABNORMAL
RBC # UR STRIP: ABNORMAL /HPF
REF LAB TEST METHOD: ABNORMAL
SP GR UR STRIP: 1.03 (ref 1–1.03)
SQUAMOUS #/AREA URNS HPF: ABNORMAL /HPF
UROBILINOGEN UR QL STRIP: ABNORMAL
WBC # UR STRIP: ABNORMAL /HPF

## 2024-11-25 PROCEDURE — 81001 URINALYSIS AUTO W/SCOPE: CPT | Performed by: OBSTETRICS & GYNECOLOGY

## 2024-11-25 PROCEDURE — 59025 FETAL NON-STRESS TEST: CPT

## 2024-11-25 PROCEDURE — 99283 EMERGENCY DEPT VISIT LOW MDM: CPT | Performed by: OBSTETRICS & GYNECOLOGY

## 2024-11-25 PROCEDURE — 87086 URINE CULTURE/COLONY COUNT: CPT | Performed by: OBSTETRICS & GYNECOLOGY

## 2024-11-25 NOTE — OBED NOTES
"[facility]  Latonya Jordan  : 2005  MRN: [id number]  CSN: [id number]    OB ED Provider Note    Subjective   Chief Complaint   Patient presents with    vaginal discomfort     OB ED-Pt came into OB ED with nondescript vaginal discomfort.  PT stated that she had an argument with family and was worried that the stress hurt her baby.     Latonya Jordan is a 19 y.o. year old  with an Estimated Date of Delivery: 25 currently at 21w3d presenting with \"squeezing feelings in my vagina\". She had a fight with her family and was concerned that it caused her to go into labor. She is noting some occasional flutters. She denies ROM or VB.    Prenatal care has been with Dr. Maldonado.  It has been benign.    OB History    Para Term  AB Living   2 0 0 0 0 0   SAB IAB Ectopic Molar Multiple Live Births   0 0 0 0 0 0      # Outcome Date GA Lbr Stephen/2nd Weight Sex Type Anes PTL Lv   2 Current            1               Past Medical History:   Diagnosis Date    COVID-19 2022    Not vaxxed    Asthma      Past Surgical History:   Procedure Laterality Date    TONSILLO-UVULOPALATOPHARYNGOPLASTY Left 2022    abscess     No current facility-administered medications for this encounter.    Allergies   Allergen Reactions    Latex Itching     Social History    Tobacco Use      Smoking status: Never      Smokeless tobacco: Not on file    Review of Systems   Genitourinary:  Positive for vaginal pain.   All other systems reviewed and are negative.        Objective   LMP 2024 (Exact Date)   General: well developed; well nourished  no acute distress  mentation appropriate   Abdomen: soft, non-tender; no masses  gravid    FHT's: 150s      Cervix: was checked (by RN): 0 cm / 0 % / Floating   Presentation: Unable to appreciate   Contractions: Not monitored   Chest: Unlabored respirations    CV:  RRR   Ext:   No C/C/E   Back: CVA tenderness is deferred bilateral        Prenatal Labs  Lab Results "   Component Value Date    HGB 12.1 09/10/2024    RUBELLAABIGG 3.49 08/22/2024    HEPBSAG Negative 08/22/2024    ABSCRN Negative 08/22/2024    IYB8XOO9 Nonreactive 09/06/2024    URINECX Final report 08/22/2024    CHLAMNAA Negative 08/06/2024    NGONORRHON Negative 08/06/2024       Current Labs Reviewed   UA:    Lab Results   Component Value Date    SQUAMEPIUA 7-12 (A) 11/25/2024    SPECGRAVUR 1.028 11/25/2024    KETONESU Trace (A) 11/25/2024    BLOODU Negative 11/25/2024    LEUKOCYTESUR Small (1+) (A) 11/25/2024    NITRITEU Negative 11/25/2024    RBCUA 0-2 11/25/2024    WBCUA 11-20 (A) 11/25/2024    BACTERIA 2+ (A) 11/25/2024          Assessment   IUP at 21w3d  Pelvic pressure- no cervical dilation     Plan   D/C home with instructions to return for worsening symptoms, acute changes.    Patricia Shay MD  11/25/2024  04:05 EST

## 2024-11-26 LAB — BACTERIA SPEC AEROBE CULT: NORMAL

## 2024-12-19 ENCOUNTER — ROUTINE PRENATAL (OUTPATIENT)
Dept: OBSTETRICS AND GYNECOLOGY | Facility: CLINIC | Age: 19
End: 2024-12-19
Payer: COMMERCIAL

## 2024-12-19 VITALS — WEIGHT: 249 LBS | SYSTOLIC BLOOD PRESSURE: 110 MMHG | DIASTOLIC BLOOD PRESSURE: 73 MMHG | BODY MASS INDEX: 40.19 KG/M2

## 2024-12-19 DIAGNOSIS — Z3A.24 24 WEEKS GESTATION OF PREGNANCY: ICD-10-CM

## 2024-12-19 DIAGNOSIS — K21.9 GASTROESOPHAGEAL REFLUX DURING PREGNANCY IN SECOND TRIMESTER, ANTEPARTUM: ICD-10-CM

## 2024-12-19 DIAGNOSIS — O99.612 GASTROESOPHAGEAL REFLUX DURING PREGNANCY IN SECOND TRIMESTER, ANTEPARTUM: ICD-10-CM

## 2024-12-19 DIAGNOSIS — Z34.02 PRIMIGRAVIDA, SECOND TRIMESTER: Primary | ICD-10-CM

## 2024-12-19 LAB
GLUCOSE UR STRIP-MCNC: NEGATIVE MG/DL
PROT UR STRIP-MCNC: NEGATIVE MG/DL

## 2024-12-19 NOTE — PROGRESS NOTES
Cc:  Pregnancy follow up.  No major complaints.  Feels well except growing pains.  No bleeding.  Fetal movement is good.  Vitals reviewed by me.  Gen - alert and pleasant.  Abdomen - gravid, nontender.  Ultrasound with completed anatomy.  Low lying placenta.    A/P:  IUP at 24 weeks  - Glucola today.  - Reviewed sonogram with normal anatomy and resolved low lying placenta.  - Follow up in 4 weeks.

## 2024-12-20 ENCOUNTER — TELEPHONE (OUTPATIENT)
Dept: OBSTETRICS AND GYNECOLOGY | Facility: CLINIC | Age: 19
End: 2024-12-20
Payer: COMMERCIAL

## 2024-12-20 LAB
ERYTHROCYTE [DISTWIDTH] IN BLOOD BY AUTOMATED COUNT: 13.8 % (ref 11.7–15.4)
GLUCOSE 1H P 50 G GLC PO SERPL-MCNC: 67 MG/DL (ref 70–139)
HCT VFR BLD AUTO: 33.5 % (ref 34–46.6)
HGB BLD-MCNC: 10.8 G/DL (ref 11.1–15.9)
MCH RBC QN AUTO: 28.4 PG (ref 26.6–33)
MCHC RBC AUTO-ENTMCNC: 32.2 G/DL (ref 31.5–35.7)
MCV RBC AUTO: 88 FL (ref 79–97)
PLATELET # BLD AUTO: 224 X10E3/UL (ref 150–450)
RBC # BLD AUTO: 3.8 X10E6/UL (ref 3.77–5.28)
WBC # BLD AUTO: 7 X10E3/UL (ref 3.4–10.8)

## 2024-12-23 LAB — TREPONEMA PALLIDUM IGG+IGM AB [PRESENCE] IN SERUM OR PLASMA BY IMMUNOASSAY: NON REACTIVE

## 2025-01-01 ENCOUNTER — HOSPITAL ENCOUNTER (EMERGENCY)
Facility: HOSPITAL | Age: 20
Discharge: HOME | End: 2025-01-01
Attending: OBSTETRICS & GYNECOLOGY | Admitting: OBSTETRICS & GYNECOLOGY
Payer: COMMERCIAL

## 2025-01-01 VITALS
BODY MASS INDEX: 40.19 KG/M2 | SYSTOLIC BLOOD PRESSURE: 116 MMHG | DIASTOLIC BLOOD PRESSURE: 73 MMHG | TEMPERATURE: 98 F | HEART RATE: 70 BPM | HEIGHT: 66 IN

## 2025-01-01 PROCEDURE — 0202U NFCT DS 22 TRGT SARS-COV-2: CPT | Performed by: OBSTETRICS & GYNECOLOGY

## 2025-01-01 PROCEDURE — 59025 FETAL NON-STRESS TEST: CPT | Performed by: OBSTETRICS & GYNECOLOGY

## 2025-01-01 PROCEDURE — 99284 EMERGENCY DEPT VISIT MOD MDM: CPT | Performed by: OBSTETRICS & GYNECOLOGY

## 2025-01-01 NOTE — OBED NOTES
[facility]  Latonya Jordan  : 2005  MRN: [id number]  CSN: [id number]    OB ED Provider Note    Subjective   No chief complaint on file.    Latonya Jordan is a 19 y.o. year old  with an Estimated Date of Delivery: 25 currently at 26w5d presenting with a few complaints. She reports she had a fall yesterday afternoon at home, slipped and hit her right hip and back. She denies any abdominal trauma, denies soreness. She also denies cramping, leaking, bleeding. She also reports feeling sick, having sore throat and had some wheezing yesterday. She denies fever, chills, body aches. She is not taking any medication.    She endorses fetal movement.    Prenatal care has been with Dr. Maldonado.  It has been uncomplicated.    OB History    Para Term  AB Living   1 0 0 0 0 0   SAB IAB Ectopic Molar Multiple Live Births   0 0 0 0 0 0      # Outcome Date GA Lbr Stephen/2nd Weight Sex Type Anes PTL Lv   1 Current              Past Medical History:   Diagnosis Date    COVID-19 2022    Not vaxxed    Asthma      Past Surgical History:   Procedure Laterality Date    TONSILLO-UVULOPALATOPHARYNGOPLASTY Left 2022    abscess     No current facility-administered medications for this encounter.    Allergies   Allergen Reactions    Latex Itching     Social History    Tobacco Use      Smoking status: Never      Smokeless tobacco: Not on file    Review of Systems +sore throat      Objective   LMP 2024 (Exact Date)   General: well developed; well nourished  no acute distress  mentation appropriate   Abdomen: soft, non-tender; no masses  gravid    FHT's: Reassuring for GA, 145, moderate, +accels, no decels      Cervix: was not checked.       Contractions: none   Chest: Unlabored respirations    CV:  normal rate, regular rhythm,  no murmurs, rubs, or gallops   Ext:   No C/C/E   Back: CVA tenderness is absent bilateral        Prenatal Labs  Lab Results   Component Value Date    HGB 10.8 (L) 2024     RUBELLAABIGG 3.49 08/22/2024    HEPBSAG Negative 08/22/2024    ABSCRN Negative 08/22/2024    GNE0HMM9 Nonreactive 09/06/2024    GCT 67 (L) 12/19/2024    URINECX 25,000 CFU/mL Mixed Venus Isolated 11/25/2024    CHLAMNAA Negative 08/06/2024    NGONORRHON Negative 08/06/2024          Assessment and Plan  IUP at 26w5d s/p fall yesterday  No abdominal pain, exam benign  2.   RH pos  Fetal status reassuring for GA  URI symptoms, resp panel pending, discussed safe cold medications       Mariposa Feldman MD  1/1/2025  13:34 EST

## 2025-01-07 ENCOUNTER — PATIENT OUTREACH (OUTPATIENT)
Dept: LABOR AND DELIVERY | Facility: HOSPITAL | Age: 20
End: 2025-01-07
Payer: COMMERCIAL

## 2025-01-07 NOTE — OUTREACH NOTE
[facility]  Unable to Reach    Questions/Answers      Flowsheet Row Responses   Pending Outreach Prenatal Check-in   Call Attempt First   Outcome Left message   Next Call Attempt Date 01/09/25            Hallie Buenrostro RN  Maternity Nurse Navigator    1/7/2025, 13:58 EST

## 2025-01-09 ENCOUNTER — PATIENT OUTREACH (OUTPATIENT)
Dept: LABOR AND DELIVERY | Facility: HOSPITAL | Age: 20
End: 2025-01-09
Payer: COMMERCIAL

## 2025-01-09 NOTE — OUTREACH NOTE
[facility]  Check-In    Current Estimated Gestational Age: 27w6d      Questions/Answers      Flowsheet Row Responses   Best Method for Contacting Cell   Demographics Reviewed Yes   Currently Employed Yes   Able to keep appointments as scheduled Yes   Gender(s) and Name(s) boy   Baby Active/Feeling Fetal Movemen Yes   How are you presently feeling? good   Supplies ready for baby Clothing, Diapers   Resource/Environmental Concerns None   Do you have any questions related to your care experience, your pregnancy, plans for delivery, any concerns, etc? No   Other Education Insurance benefits/Incentives, How to find a pediatrician          Pt doing well, she reports active fetal movement. She is working on infant supplies and hoping to have a baby shower this spring. She now has WIC/SNAP benefits. She applied to several income based apartments, waiting for an opening. F/u next month.    Hallie Buenrostro RN  Maternity Nurse Navigator    1/9/2025, 12:40 EST